# Patient Record
Sex: FEMALE | Race: WHITE | Employment: FULL TIME | URBAN - NONMETROPOLITAN AREA
[De-identification: names, ages, dates, MRNs, and addresses within clinical notes are randomized per-mention and may not be internally consistent; named-entity substitution may affect disease eponyms.]

---

## 2018-08-26 ENCOUNTER — HOSPITAL ENCOUNTER (EMERGENCY)
Age: 38
Discharge: HOME | End: 2018-08-26

## 2018-08-26 ENCOUNTER — APPOINTMENT (OUTPATIENT)
Dept: CT IMAGING | Age: 38
End: 2018-08-26

## 2018-08-26 ENCOUNTER — APPOINTMENT (OUTPATIENT)
Dept: GENERAL RADIOLOGY | Age: 38
End: 2018-08-26

## 2018-08-26 VITALS
DIASTOLIC BLOOD PRESSURE: 81 MMHG | TEMPERATURE: 98.2 F | BODY MASS INDEX: 27.4 KG/M2 | RESPIRATION RATE: 18 BRPM | WEIGHT: 185 LBS | HEIGHT: 69 IN | OXYGEN SATURATION: 100 % | SYSTOLIC BLOOD PRESSURE: 115 MMHG | HEART RATE: 62 BPM

## 2018-08-26 DIAGNOSIS — S20.219A CONTUSION OF CHEST WALL, UNSPECIFIED LATERALITY, INITIAL ENCOUNTER: ICD-10-CM

## 2018-08-26 DIAGNOSIS — R07.89 CHEST WALL PAIN: Primary | ICD-10-CM

## 2018-08-26 PROCEDURE — 71046 X-RAY EXAM CHEST 2 VIEWS: CPT

## 2018-08-26 PROCEDURE — 99285 EMERGENCY DEPT VISIT HI MDM: CPT

## 2018-08-26 PROCEDURE — 6370000000 HC RX 637 (ALT 250 FOR IP): Performed by: PHYSICIAN ASSISTANT

## 2018-08-26 PROCEDURE — 93005 ELECTROCARDIOGRAM TRACING: CPT | Performed by: PHYSICIAN ASSISTANT

## 2018-08-26 PROCEDURE — 71250 CT THORAX DX C-: CPT

## 2018-08-26 RX ORDER — METHOCARBAMOL 500 MG/1
500 TABLET, FILM COATED ORAL 4 TIMES DAILY
Qty: 20 TABLET | Refills: 0 | Status: SHIPPED | OUTPATIENT
Start: 2018-08-26 | End: 2018-08-31

## 2018-08-26 RX ORDER — IBUPROFEN 600 MG/1
600 TABLET ORAL EVERY 6 HOURS PRN
Qty: 20 TABLET | Refills: 0 | Status: SHIPPED | OUTPATIENT
Start: 2018-08-26

## 2018-08-26 RX ORDER — IBUPROFEN 600 MG/1
600 TABLET ORAL ONCE
Status: COMPLETED | OUTPATIENT
Start: 2018-08-26 | End: 2018-08-26

## 2018-08-26 RX ADMIN — IBUPROFEN 600 MG: 600 TABLET ORAL at 18:30

## 2018-08-26 ASSESSMENT — ENCOUNTER SYMPTOMS
COUGH: 0
SHORTNESS OF BREATH: 0
ABDOMINAL PAIN: 0
VOMITING: 0

## 2018-08-26 ASSESSMENT — PAIN SCALES - GENERAL
PAINLEVEL_OUTOF10: 4
PAINLEVEL_OUTOF10: 4

## 2018-08-26 ASSESSMENT — HEART SCORE: ECG: 0

## 2018-08-26 ASSESSMENT — PAIN DESCRIPTION - LOCATION: LOCATION: CHEST

## 2018-08-26 ASSESSMENT — PAIN DESCRIPTION - PAIN TYPE: TYPE: ACUTE PAIN

## 2018-08-26 NOTE — ED PROVIDER NOTES
IMELDA independent visit    Patient was wrestling around with her kids 4-5 days ago and was elbowed in the midsternal chest and felt a pop and pain since, pain with movement, stretching her chest, pain at sternum and right chest. No trouble breathing but has some pain with deep breathing. 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 No other injuries. The history is provided by the patient. Chest Pain   Pain location:  Substernal area and R chest  Pain quality: aching and stabbing    Pain radiates to:  Does not radiate  Pain severity:  Moderate  Onset quality:  Sudden  Progression:  Worsening  Chronicity:  New  Worsened by:  Deep breathing, movement and certain positions  Associated symptoms: no abdominal pain, no cough, no fever, no lower extremity edema, no shortness of breath, no syncope and no vomiting    Risk factors: no coronary artery disease, no hypertension, no prior DVT/PE and no smoking        Review of Systems   Constitutional: Negative for fever. Respiratory: Negative for cough and shortness of breath. Cardiovascular: Positive for chest pain. Negative for leg swelling and syncope. Gastrointestinal: Negative for abdominal pain and vomiting. PAST MEDICAL HISTORY   has a past medical history of Frequent headaches. PAST SURGICAL HISTORY   has a past surgical history that includes Cholecystectomy and tatum and bso (cervix removed). FAMILY HISTORY  family history includes Cancer in her paternal grandfather; Migraines in her mother. SOCIAL HISTORY   reports that she has never smoked. She has never used smokeless tobacco. She reports that she does not drink alcohol. HOME MEDICATIONS     none    ALLERGIES  has No Known Allergies.      /81   Pulse 62   Temp 98.2 °F (36.8 °C) (Oral)   Resp 18   Ht 5' 8.5\" (1.74 m)   Wt 185 lb (83.9 kg)   LMP 08/21/2018   SpO2 100%   BMI 27.72 kg/m²     Physical Exam   Constitutional: She is oriented to person, place, and time. She appears well-developed and well-nourished. Non-toxic appearance. She does not have a sickly appearance. She does not appear ill. HENT:   Head: Normocephalic and atraumatic. Mouth/Throat: Oropharynx is clear and moist.   Eyes: Pupils are equal, round, and reactive to light. Conjunctivae are normal.   Neck: Normal range of motion. Neck supple. Cardiovascular: Normal rate, regular rhythm, normal heart sounds and intact distal pulses. Pulmonary/Chest: Effort normal and breath sounds normal. No respiratory distress. She has no wheezes. She has no rales. She exhibits tenderness (midsternal and right anterior chest wall, no bony step offs or crepitus,  small bruise ?swelling over upper sternum. no subcutaneous emphysema. ). Abdominal: Soft. Bowel sounds are normal. There is no tenderness. There is no rebound and no guarding. Musculoskeletal: She exhibits no edema. Neurological: She is alert and oriented to person, place, and time. She exhibits normal muscle tone. Skin: Skin is warm and dry. Psychiatric: She has a normal mood and affect. Her behavior is normal.   Vitals reviewed.       Procedures    MDM  Number of Diagnoses or Management Options  Chest wall pain:   Contusion of chest wall, unspecified laterality, initial encounter:      Amount and/or Complexity of Data Reviewed  Tests in the radiology section of CPT®: ordered and reviewed    Patient Progress  Patient progress: stable          Results for orders placed or performed during the hospital encounter of 08/26/18   EKG 12 Lead   Result Value Ref Range    Ventricular Rate 52 BPM    Atrial Rate 52 BPM    P-R Interval 116 ms    QRS Duration 76 ms    Q-T Interval 440 ms    QTc Calculation (Bazett) 409 ms    P Axis 74 degrees    R Axis 78 degrees    T Axis 57 degrees    Diagnosis       Sinus bradycardiaOtherwise normal ECGNo previous ECGs available     Xr Chest Standard (2 Vw)    Result Date: 8/26/2018  EXAMINATION: TWO clear with exception a right mid lung calcified granuloma. Upper Abdomen: Limited noncontrast images of the upper abdomen show no acute abnormality. The gallbladder surgically absent. Soft Tissues/Bones: No acute fracture is identified. No acute intrathoracic process identified. 6:25 PM  EKG interpreted by myself and physician sinus bradycardia rate 52. No ST changes. No acute ischemia. No prior for comparison. 7:17 PM  Discussed results with patient. No acute intrathoracic injury on scan. No pneumothorax. No fracture. No evidence of cardiac contusion. Impression chest wall pain, contusion. Advised rest, no heavy lifting or straining, ice packs, ibuprofen, Robaxin. Given work note. To have reevaluation with primary care doctor within one week. Advised return to ER for worsening symptoms: Worsening pain any difficulty breathing, hemoptysis, fevers or vomiting. She understands and agrees. I estimate there is LOW risk for  PNEUMOTHORAX, HEMOTHORAX, PERICARDIAL TAMPONADE, CARDIAC CONTUSION, or a THORACIC AORTIC DISSECTION, thus I consider the discharge disposition reasonable. Also, there is no evidence or peritonitis, sepsis, or toxicity. Please note that this chart was generated using Dragon dictation software.  Although every effort was made to ensure the accuracy of this automated transcription, some errors in transcription may have occurred       Fany Acosta PA-C  08/26/18 [id number]

## 2018-08-26 NOTE — ED NOTES
Instructed home and rest. Ibuprofen every 6 hours. Robaxin every 6 hours. If needs more for pain can use tylenol 1000mg every 6 hours. Return for increased pain sob. F/u if no better in 7 with pcp.  Pt v/u     Welby Nageotte, RN  08/26/18 Melanie Bird

## 2018-08-26 NOTE — ED PROVIDER NOTES
EKG interpreted by Perez Gomez MD    Rhythm: sinus bradycardia  Rate: 52 bpm  Axis: normal  Ectopy: none  Conduction: normal  ST Segments: no acute change  T Waves: no acute change  Q Waves: none         Perez Gomez MD  08/29/18 [id number]

## 2018-08-27 LAB
EKG ATRIAL RATE: 52 BPM
EKG DIAGNOSIS: NORMAL
EKG P AXIS: 74 DEGREES
EKG P-R INTERVAL: 116 MS
EKG Q-T INTERVAL: 440 MS
EKG QRS DURATION: 76 MS
EKG QTC CALCULATION (BAZETT): 409 MS
EKG R AXIS: 78 DEGREES
EKG T AXIS: 57 DEGREES
EKG VENTRICULAR RATE: 52 BPM

## 2018-08-27 PROCEDURE — 93010 ELECTROCARDIOGRAM REPORT: CPT | Performed by: INTERNAL MEDICINE
